# Patient Record
Sex: MALE | Race: WHITE
[De-identification: names, ages, dates, MRNs, and addresses within clinical notes are randomized per-mention and may not be internally consistent; named-entity substitution may affect disease eponyms.]

---

## 2017-06-14 ENCOUNTER — APPOINTMENT (OUTPATIENT)
Dept: CARDIOLOGY | Facility: CLINIC | Age: 77
End: 2017-06-14

## 2017-07-11 ENCOUNTER — APPOINTMENT (OUTPATIENT)
Dept: CARDIOLOGY | Facility: CLINIC | Age: 77
End: 2017-07-11

## 2017-07-19 ENCOUNTER — APPOINTMENT (OUTPATIENT)
Dept: CARDIOLOGY | Facility: CLINIC | Age: 77
End: 2017-07-19

## 2018-01-05 ENCOUNTER — APPOINTMENT (OUTPATIENT)
Dept: CARDIOLOGY | Facility: CLINIC | Age: 78
End: 2018-01-05

## 2019-10-09 ENCOUNTER — APPOINTMENT (OUTPATIENT)
Dept: CARDIOLOGY | Facility: CLINIC | Age: 79
End: 2019-10-09
Payer: MEDICARE

## 2019-10-09 ENCOUNTER — NON-APPOINTMENT (OUTPATIENT)
Age: 79
End: 2019-10-09

## 2019-10-09 VITALS
BODY MASS INDEX: 24.11 KG/M2 | OXYGEN SATURATION: 97 % | SYSTOLIC BLOOD PRESSURE: 140 MMHG | HEART RATE: 52 BPM | DIASTOLIC BLOOD PRESSURE: 66 MMHG | WEIGHT: 150 LBS | HEIGHT: 66 IN

## 2019-10-09 DIAGNOSIS — I10 ESSENTIAL (PRIMARY) HYPERTENSION: ICD-10-CM

## 2019-10-09 DIAGNOSIS — R00.1 BRADYCARDIA, UNSPECIFIED: ICD-10-CM

## 2019-10-09 DIAGNOSIS — J40 BRONCHITIS, NOT SPECIFIED AS ACUTE OR CHRONIC: ICD-10-CM

## 2019-10-09 DIAGNOSIS — I47.2 VENTRICULAR TACHYCARDIA: ICD-10-CM

## 2019-10-09 DIAGNOSIS — R73.03 PREDIABETES.: ICD-10-CM

## 2019-10-09 PROCEDURE — 0296T: CPT | Mod: 59

## 2019-10-09 PROCEDURE — 99214 OFFICE O/P EST MOD 30 MIN: CPT

## 2019-10-09 PROCEDURE — 93000 ELECTROCARDIOGRAM COMPLETE: CPT

## 2019-10-09 RX ORDER — PSYLLIUM HUSK 0.4 G
CAPSULE ORAL
Refills: 0 | Status: ACTIVE | COMMUNITY

## 2019-10-09 RX ORDER — MULTIVITAMIN
TABLET ORAL
Refills: 0 | Status: ACTIVE | COMMUNITY

## 2019-10-09 NOTE — ASSESSMENT
[FreeTextEntry1] : Assessment: \par \par Status post EP evaluation - Asymptomatic wide complex tachycardia nonsustained type in a patient with no structural heart disease who has had relatively low heart rates consistent with sinus bradycardia at which he has been asymptomatic as well and is on low-dose beta blocker.\par \par Recommendation(s):  He seems to be low risk for any cardiac arrhythmias of life-threatening type in light of his structurally normal heart and benign appearing 12-lead electrocardiograms. He has no symptoms from a cardiac standpoint \par \par Recommend continuation of low-dose Toprol \par Repeat echocardiogram and 72 hours rhythm monitoring\par Followup EP\par \par \par Sincerely,\par Boni Vasquez MD, FACC, JELANI

## 2019-10-09 NOTE — PHYSICAL EXAM
[General Appearance - Well Developed] : well developed [Normal Appearance] : normal appearance [Well Groomed] : well groomed [General Appearance - Well Nourished] : well nourished [No Deformities] : no deformities [General Appearance - In No Acute Distress] : no acute distress [Normal Conjunctiva] : the conjunctiva exhibited no abnormalities [Normal Oral Mucosa] : normal oral mucosa [Eyelids - No Xanthelasma] : the eyelids demonstrated no xanthelasmas [No Oral Cyanosis] : no oral cyanosis [No Oral Pallor] : no oral pallor [Normal Jugular Venous V Waves Present] : normal jugular venous V waves present [Normal Jugular Venous A Waves Present] : normal jugular venous A waves present [No Jugular Venous Robin A Waves] : no jugular venous robin A waves [Respiration, Rhythm And Depth] : normal respiratory rhythm and effort [Exaggerated Use Of Accessory Muscles For Inspiration] : no accessory muscle use [Auscultation Breath Sounds / Voice Sounds] : lungs were clear to auscultation bilaterally [Heart Rate And Rhythm] : heart rate and rhythm were normal [Heart Sounds] : normal S1 and S2 [Murmurs] : no murmurs present [Abdomen Soft] : soft [Abdomen Tenderness] : non-tender [Abnormal Walk] : normal gait [Gait - Sufficient For Exercise Testing] : the gait was sufficient for exercise testing [Nail Clubbing] : no clubbing of the fingernails [Cyanosis, Localized] : no localized cyanosis [Petechial Hemorrhages (___cm)] : no petechial hemorrhages [Skin Color & Pigmentation] : normal skin color and pigmentation [] : no rash [No Venous Stasis] : no venous stasis [Skin Lesions] : no skin lesions [No Skin Ulcers] : no skin ulcer [No Xanthoma] : no  xanthoma was observed [Oriented To Time, Place, And Person] : oriented to person, place, and time [Mood] : the mood was normal [Affect] : the affect was normal [No Anxiety] : not feeling anxious

## 2019-10-09 NOTE — HISTORY OF PRESENT ILLNESS
[FreeTextEntry1] : Curtis Hudson is a very pleasant 79-year-old male with a history of hypertension and ulcerative colitis who has had documentation supporting wide complex tachycardia which was asymptomatic on 2 occasions and nonsustained. In 2007 19 beats of wide complex tachycardia was recorded on Holter monitor and in 2014 there was a total of 27 beats noted. Asymptomatic\par \par There is no history of syncope or presyncope. He denies palpitations. It is also important to point out that he wore a mobile cardiac outpatient telemetry monitor in June of 2015 in which paroxysmal atrial tachycardia was picked up. Again, this was asymptomatic and it certainly raises the question as to whether or not aberrantly conducted supraventricular arrhythmia was actually the diagnosis for the wide complex tachycardia seen in 2007 and 2014 on Holter monitor. Also reported on the mobile cardiac outpatient telemetry monitor in June of 2015 was a slow heart rate at 32 beats per minute which occurred as sinus rhythm while asleep without symptoms. \par \par Structurally, his heart Evaluation with echocardiogram has shown normal ejection fraction.  A stress echocardiogram in July 2014 was negative for ischemia.\par \par Over the past 2 years, he does not report any change in clinical status. He denies PND, orthopnea, pedal edema, chest pain, syncope.

## 2019-10-17 ENCOUNTER — APPOINTMENT (OUTPATIENT)
Dept: CARDIOLOGY | Facility: CLINIC | Age: 79
End: 2019-10-17
Payer: MEDICARE

## 2019-10-17 PROCEDURE — 93306 TTE W/DOPPLER COMPLETE: CPT

## 2019-10-17 PROCEDURE — 0298T: CPT

## 2019-10-25 ENCOUNTER — APPOINTMENT (OUTPATIENT)
Dept: ELECTROPHYSIOLOGY | Facility: CLINIC | Age: 79
End: 2019-10-25
Payer: MEDICARE

## 2019-10-25 ENCOUNTER — NON-APPOINTMENT (OUTPATIENT)
Age: 79
End: 2019-10-25

## 2019-10-25 VITALS
WEIGHT: 150 LBS | HEART RATE: 51 BPM | DIASTOLIC BLOOD PRESSURE: 60 MMHG | OXYGEN SATURATION: 96 % | SYSTOLIC BLOOD PRESSURE: 148 MMHG | BODY MASS INDEX: 24.21 KG/M2

## 2019-10-25 DIAGNOSIS — R00.1 BRADYCARDIA, UNSPECIFIED: ICD-10-CM

## 2019-10-25 DIAGNOSIS — Z78.9 OTHER SPECIFIED HEALTH STATUS: ICD-10-CM

## 2019-10-25 DIAGNOSIS — I47.1 SUPRAVENTRICULAR TACHYCARDIA: ICD-10-CM

## 2019-10-25 PROCEDURE — 93000 ELECTROCARDIOGRAM COMPLETE: CPT

## 2019-10-25 PROCEDURE — 99205 OFFICE O/P NEW HI 60 MIN: CPT | Mod: 25

## 2019-10-25 NOTE — REASON FOR VISIT
[Ventricular Tachycardia] : ventricular tachycardia [Initial Evaluation] : an initial evaluation of [Spouse] : spouse

## 2019-11-04 NOTE — HISTORY OF PRESENT ILLNESS
[FreeTextEntry1] : The patient was previously seen by Dr. Light in 2016 for nonsustained ventricular tachycardia.  The patient has a prior history of hypertension and ulcerative colitis.\par \par Based on the chart report he was first noted to have ventricular tachycardia in 2007 with 19 beats of wide-complex tachycardia was noted on Holter monitor.  In 2014 it was reported 27 beats at Holter monitor.  The patient has not had symptoms specifically he has not had syncope or presyncope.  He denies any palpitations, dizziness, lightheadedness, syncope or presyncope.  When he was seen by Dr. Light there was no specific recommendations other than beta-blocker therapy.  Patient apparently has done well and continues to remain asymptomatic.  He had a follow-up echocardiogram that was done on October 17, 2019.  It showed ejection fraction of 60%.  There was minimal mitral regurgitation.  Normal systolic function.  There is normal left ventricular internal dimensions and wall thickness.  Normal diastolic function.  No pericardial effusion.\par \par He also had a monitor placed for approximately 3 days from 10/9 -10/12.  It showed that he had 6 beats of nonsustained ventricular tachycardia.  He also had isolated PVCs. He asymptomatic during these episodes. \par \par The patient has been on metoprolol 25 mg/day.\par \par The patient recently had a stress echo performed July 2014 and there was normal wall motion.\par \par Since his visit in 2016 he denies any new symptoms or complaints. No palpitations, dizziness, lightheadedness, syncope, pre syncope, shortness of breath, or chest pain. 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 His spouse says he does not snore.

## 2019-11-04 NOTE — PHYSICAL EXAM
[General Appearance - Well Developed] : well developed [General Appearance - In No Acute Distress] : no acute distress [General Appearance - Well Nourished] : well nourished [Normal Conjunctiva] : the conjunctiva exhibited no abnormalities [Eyelids - No Xanthelasma] : the eyelids demonstrated no xanthelasmas [No Oral Pallor] : no oral pallor [No Oral Cyanosis] : no oral cyanosis [Heart Sounds] : normal S1 and S2 [Heart Rate And Rhythm] : heart rate and rhythm were normal [Arterial Pulses Normal] : the arterial pulses were normal [Murmurs] : no murmurs present [Edema] : no peripheral edema present [Exaggerated Use Of Accessory Muscles For Inspiration] : no accessory muscle use [Abdomen Soft] : soft [Abdomen Tenderness] : non-tender [Abnormal Walk] : normal gait [Gait - Sufficient For Exercise Testing] : the gait was sufficient for exercise testing [Nail Clubbing] : no clubbing of the fingernails [Cyanosis, Localized] : no localized cyanosis [Impaired Insight] : insight and judgment were intact [] : no rash [Affect] : the affect was normal [FreeTextEntry1] : No JVD

## 2019-11-04 NOTE — END OF VISIT
[FreeTextEntry3] : I, Efrain Blackman, acted solely as a scribe for Dr. Ihsan Rey MD on this date 10/25/2019. \par \par All medical records entries made by the Scribe were at my, Dr. Ihsan Rey MD, direction and personally dictated by me on 10/25/2019. I have personally reviewed the chart and agree that the record accurately reflects my personal performance of the history, physical exam, assessment, and plan. I have also personally directed, reviewed, and agreed with the chart.

## 2019-11-04 NOTE — DISCUSSION/SUMMARY
[FreeTextEntry1] : The patient has had prior nonsustained ventricular tachycardia and has not had symptoms.  He has preserved left ventricular function.  He is currently on a beta-blocker.  \par \par EKG performed today shows sinus bradycardia at a rate of 44. There are normal intervals and axis. There are no acute ST or T wave changes noted. \par \par Review of the event monitor from 10/9 to 10/12 showed 6 beats of ventricular tachycardia on Oct 10th at 4 am. \par \par I had a discussion regarding today's visit, the diagnosis, and treatment recommendations / options. At this time, we would like to further stratify using a cardiac MRI scan. He will return to see me to discuss the results. In the meantime, we recommend to continue with the beta block medication.\par \par The patient has agreed to this plan of management and has expressed full understanding.  All questions were fully answered to the patient's satisfaction.\par \par Over 50% of the time spent with the patient was on counseling the patient on the above diagnosis, treatment plan and prognosis. \par